# Patient Record
Sex: FEMALE | Race: BLACK OR AFRICAN AMERICAN | Employment: OTHER | URBAN - METROPOLITAN AREA
[De-identification: names, ages, dates, MRNs, and addresses within clinical notes are randomized per-mention and may not be internally consistent; named-entity substitution may affect disease eponyms.]

---

## 2019-12-20 ENCOUNTER — OFFICE VISIT (OUTPATIENT)
Dept: PRIMARY CARE CLINIC | Age: 34
End: 2019-12-20

## 2019-12-20 VITALS
OXYGEN SATURATION: 99 % | HEART RATE: 61 BPM | DIASTOLIC BLOOD PRESSURE: 80 MMHG | TEMPERATURE: 97.9 F | SYSTOLIC BLOOD PRESSURE: 122 MMHG | WEIGHT: 117 LBS | HEIGHT: 64 IN | BODY MASS INDEX: 19.97 KG/M2 | RESPIRATION RATE: 14 BRPM

## 2019-12-20 DIAGNOSIS — H53.8 BLURRED VISION, RIGHT EYE: ICD-10-CM

## 2019-12-20 DIAGNOSIS — R51.9 SUDDEN ONSET OF SEVERE HEADACHE: Primary | ICD-10-CM

## 2019-12-20 DIAGNOSIS — R41.0 EPISODIC CONFUSION: ICD-10-CM

## 2019-12-20 NOTE — PROGRESS NOTES
Written by Dayo Aburto, as dictated by Dr. Hina Bailon MD.    Ruthann Singletary is a 37 y.o. female. HPI  The patient comes in today to establish care and discuss her headache. She c/o headaches. After having her first 2 children around 4 years ago, she notes it was a very stressful time as her  was away for work, and it was a busy day. When she bent down to  something from the floor, she had an \"explosive\" R-sided headache, and was not able to talk or comprehend what her sister was saying. She was only able to say \"my head\" and \"sleep\" prior to going to sleep. She also was not able to remember to children's names either. The next morning, she was still having difficult speaking and thinking, and it took a while for it to resolve. Now, she still has difficultly with a speech delay, but it has been ok. However most recently in 09/2019, she was very upset and the following morning she had a headache with intense neck pain. The following day, she fainted, and upon waking up, her \"head was on fire,\" and had associated nausea, dizziness, blurred vision in her R eye, R ear pain with drainage, confusion, and memory issues. Denies weakness. The symptoms persisted for the next few days as well and then resolved. After a few days, she went to the [facility] ED, and was told to go to a Neurologist, as they did not find anything abnormal, including stroke or cancer. They also recommended acupuncture, yoga and other de-stressing things. They also recommending Ibuprofen 800 mg TID x 5 days, but she did not take it for long as she started having \"kidney pain. \" A CT/MRI of her head was not done at that time. She has had sinus infections in the past, growing up. Denies h/o anemia, and allergies. Her last pap smear was done in 10/2019. She last received the Tdap about 4 years ago during her last pregnancy.  She would not like to get any other vaccinations. She owns a [employer] business. She has 3 children and no longer plans to have more children. No current outpatient medications on file prior to visit. No current facility-administered medications on file prior to visit. Family History   Problem Relation Age of Onset   Sergio Gleason Hypertension Mother     Hypertension Father     Heart Attack Father     No Known Problems Sister        Social History     Socioeconomic History    Marital status: UNKNOWN     Spouse name: Not on file    Number of children: Not on file    Years of education: Not on file    Highest education level: Not on file   Occupational History    Not on file   Social Needs    Financial resource strain: Not on file    Food insecurity:     Worry: Not on file     Inability: Not on file    Transportation needs:     Medical: Not on file     Non-medical: Not on file   Tobacco Use    Smoking status: Current Every Day Smoker     Packs/day: 0.25    Smokeless tobacco: Never Used    Tobacco comment: 3/5 DAILY   Substance and Sexual Activity    Alcohol use:  Yes     Alcohol/week: 1.0 standard drinks     Types: 1 Cans of beer per week    Drug use: Never    Sexual activity: Not Currently   Lifestyle    Physical activity:     Days per week: Not on file     Minutes per session: Not on file    Stress: Not on file   Relationships    Social connections:     Talks on phone: Not on file     Gets together: Not on file     Attends Mandaeism service: Not on file     Active member of club or organization: Not on file     Attends meetings of clubs or organizations: Not on file     Relationship status: Not on file    Intimate partner violence:     Fear of current or ex partner: Not on file     Emotionally abused: Not on file     Physically abused: Not on file     Forced sexual activity: Not on file   Other Topics Concern    Not on file   Social History Narrative    Not on file       Review of Systems Constitutional: Negative for malaise/fatigue and weight loss. HENT: Positive for ear discharge (R ear) and ear pain (R ear). Negative for congestion and hearing loss. Eyes: Positive for blurred vision (R eye). Negative for photophobia. Respiratory: Negative for cough and shortness of breath. Cardiovascular: Negative for chest pain and leg swelling. Gastrointestinal: Negative for constipation, diarrhea and heartburn. Genitourinary: Negative for dysuria, frequency and urgency. Musculoskeletal: Positive for neck pain. Negative for joint pain and myalgias. Neurological: Positive for speech change (with initial headache onset) and headaches (intermittent). Negative for dizziness. Psychiatric/Behavioral: Negative for depression and substance abuse. The patient is not nervous/anxious and does not have insomnia. Visit Vitals  /80 (BP 1 Location: Left arm, BP Patient Position: Sitting)   Pulse 61   Temp 97.9 °F (36.6 °C) (Oral)   Resp 14   Ht 5' 4\" (1.626 m)   Wt 117 lb (53.1 kg)   LMP 12/03/2019   SpO2 99%   BMI 20.08 kg/m²       Physical Exam  Vitals signs and nursing note reviewed. Constitutional:       General: She is not in acute distress. Appearance: Normal appearance. She is well-developed and normal weight. She is not diaphoretic. HENT:      Head: Normocephalic and atraumatic. Right Ear: Tympanic membrane, ear canal and external ear normal.      Left Ear: Tympanic membrane, ear canal and external ear normal.      Ears:      Comments: + BL ear with fluid behind TM     Nose:      Right Sinus: No maxillary sinus tenderness. Left Sinus: No maxillary sinus tenderness. Mouth/Throat:      Mouth: Mucous membranes are moist.      Pharynx: Oropharynx is clear. Eyes:      General: Lids are normal. Gaze aligned appropriately. Right eye: No discharge. Left eye: No discharge. Extraocular Movements: Extraocular movements intact.       Conjunctiva/sclera: Conjunctivae normal.      Pupils: Pupils are equal, round, and reactive to light. Neck:      Musculoskeletal: Normal range of motion and neck supple. Cardiovascular:      Rate and Rhythm: Normal rate and regular rhythm. Heart sounds: Normal heart sounds. No murmur. No friction rub. No gallop. Pulmonary:      Effort: Pulmonary effort is normal.      Breath sounds: Normal breath sounds. No wheezing. Abdominal:      General: Bowel sounds are normal.      Palpations: Abdomen is soft. Tenderness: There is no tenderness. Musculoskeletal: Normal range of motion. Neurological:      Mental Status: She is alert and oriented to person, place, and time. Deep Tendon Reflexes: Reflexes are normal and symmetric. Psychiatric:         Behavior: Behavior normal.         Thought Content: Thought content normal.         ASSESSMENT and PLAN    ICD-10-CM ICD-9-CM    1. Sudden onset of severe headache K54 059.7 METABOLIC PANEL, COMPREHENSIVE      CBC W/O DIFF      MRI BRAIN W WO CONT  REFERRAL TO NEUROLOGY    CMP, and CBC ordered. MRI Brain ordered. Referred to Neurology. 2. Blurred vision, right eye H53.8 368.8 MRI BRAIN W WO CONT  REFERRAL TO NEUROLOGY    MRI Brain ordered. Referred to Neurology. 3. Episodic confusion R41.0 298.9 MRI BRAIN W WO CONT  REFERRAL TO NEUROLOGY    MRI Brain ordered. Referred to Neurology. This plan was reviewed with the patient and patient agrees. All questions were answered. This scribe documentation was reviewed by me and accurately reflects the examination and decisions made by me. This note will not be viewable in [address]

## 2019-12-20 NOTE — PROGRESS NOTES
Chief Complaint   Patient presents with   [address]     having headaches         1. Have you been to the ER, urgent care clinic since your last visit? Hospitalized since your last visit ? YES [facility] for headaches. 2. Have you seen or consulted any other health care providers outside of the [address] [place] [address] since your last visit? Include any pap smears or colon screening.   Yes pap normal in 10/2019

## 2019-12-21 LAB
ALBUMIN SERPL-MCNC: 4.9 G/DL (ref 3.5–5.5)
ALBUMIN/GLOB SERPL: 1.9 {RATIO} (ref 1.2–2.2)
ALP SERPL-CCNC: 49 IU/L (ref 39–117)
ALT SERPL-CCNC: 14 IU/L (ref 0–32)
AST SERPL-CCNC: 15 IU/L (ref 0–40)
BILIRUB SERPL-MCNC: 0.8 MG/DL (ref 0–1.2)
BUN SERPL-MCNC: 7 MG/DL (ref 6–20)
BUN/CREAT SERPL: 10 (ref 9–23)
CALCIUM SERPL-MCNC: 10.4 MG/DL (ref 8.7–10.2)
CHLORIDE SERPL-SCNC: 101 MMOL/L (ref 96–106)
CO2 SERPL-SCNC: 23 MMOL/L (ref 20–29)
CREAT SERPL-MCNC: 0.71 MG/DL (ref 0.57–1)
ERYTHROCYTE [DISTWIDTH] IN BLOOD BY AUTOMATED COUNT: 12 % (ref 12.3–15.4)
GLOBULIN SER CALC-MCNC: 2.6 G/DL (ref 1.5–4.5)
GLUCOSE SERPL-MCNC: 83 MG/DL (ref 65–99)
HCT VFR BLD AUTO: 40.8 % (ref 34–46.6)
HGB BLD-MCNC: 13.9 G/DL (ref 11.1–15.9)
MCH RBC QN AUTO: 31.1 PG (ref 26.6–33)
MCHC RBC AUTO-ENTMCNC: 34.1 G/DL (ref 31.5–35.7)
MCV RBC AUTO: 91 FL (ref 79–97)
PLATELET # BLD AUTO: 315 X10E3/UL (ref 150–450)
POTASSIUM SERPL-SCNC: 4.6 MMOL/L (ref 3.5–5.2)
PROT SERPL-MCNC: 7.5 G/DL (ref 6–8.5)
RBC # BLD AUTO: 4.47 X10E6/UL (ref 3.77–5.28)
SODIUM SERPL-SCNC: 140 MMOL/L (ref 134–144)
WBC # BLD AUTO: 4.7 X10E3/UL (ref 3.4–10.8)

## 2019-12-24 ENCOUNTER — HOSPITAL ENCOUNTER (OUTPATIENT)
Dept: MRI IMAGING | Age: 34
Discharge: HOME | End: 2019-12-24
Attending: INTERNAL MEDICINE
Payer: MEDICAID

## 2019-12-24 VITALS — WEIGHT: 117 LBS | BODY MASS INDEX: 20.08 KG/M2

## 2019-12-24 DIAGNOSIS — R41.0 EPISODIC CONFUSION: ICD-10-CM

## 2019-12-24 DIAGNOSIS — R51.9 SUDDEN ONSET OF SEVERE HEADACHE: ICD-10-CM

## 2019-12-24 DIAGNOSIS — H53.8 BLURRED VISION, RIGHT EYE: ICD-10-CM

## 2019-12-24 PROCEDURE — 74011250636 HC RX REV CODE- 250/636: Performed by: INTERNAL MEDICINE

## 2019-12-24 PROCEDURE — 70553 MRI BRAIN STEM W/O & W/DYE: CPT

## 2019-12-24 PROCEDURE — A9575 INJ GADOTERATE MEGLUMI 0.1ML: HCPCS | Performed by: INTERNAL MEDICINE

## 2019-12-24 RX ORDER — GADOTERATE MEGLUMINE 376.9 MG/ML
10 INJECTION INTRAVENOUS ONCE
Status: COMPLETED | OUTPATIENT
Start: 2019-12-24 | End: 2019-12-24

## 2019-12-24 RX ADMIN — GADOTERATE MEGLUMINE 10 ML: 376.9 INJECTION INTRAVENOUS at 08:05

## 2019-12-24 NOTE — PROGRESS NOTES
Seth Priest, your brain MRI came back fine. Please keep your appointment with Neurologist. He might going to order an( MRA)  which is another test to see blood vessels in your brain.

## 2020-01-07 ENCOUNTER — OFFICE VISIT (OUTPATIENT)
Dept: NEUROLOGY | Age: 35
End: 2020-01-07

## 2020-01-07 VITALS
OXYGEN SATURATION: 99 % | SYSTOLIC BLOOD PRESSURE: 106 MMHG | HEART RATE: 60 BPM | RESPIRATION RATE: 16 BRPM | HEIGHT: 64 IN | WEIGHT: 117 LBS | DIASTOLIC BLOOD PRESSURE: 68 MMHG | BODY MASS INDEX: 19.97 KG/M2

## 2020-01-07 DIAGNOSIS — G43.709 CHRONIC MIGRAINE WITHOUT AURA WITHOUT STATUS MIGRAINOSUS, NOT INTRACTABLE: Primary | ICD-10-CM

## 2020-01-07 RX ORDER — METHYLPREDNISOLONE 4 MG/1
TABLET ORAL
Qty: 1 DOSE PACK | Refills: 0 | Status: SHIPPED | OUTPATIENT
Start: 2020-01-07

## 2020-01-07 RX ORDER — NORTRIPTYLINE HYDROCHLORIDE 10 MG/1
10 CAPSULE ORAL
Qty: 30 CAP | Refills: 1 | Status: SHIPPED | OUTPATIENT
Start: 2020-01-07 | End: 2020-03-10

## 2020-01-07 RX ORDER — SUMATRIPTAN 50 MG/1
50 TABLET, FILM COATED ORAL
Qty: 9 TAB | Refills: 1 | Status: SHIPPED | OUTPATIENT
Start: 2020-01-07 | End: 2020-01-07
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
## 2020-01-07 NOTE — PROGRESS NOTES
Ms. Francine Velez presents as a new patient for evaluation of headaches and blurred vision that began approximately four months ago. Depression screening done on patient.

## 2020-01-07 NOTE — PATIENT INSTRUCTIONS
[facility]   Statement to Patients  April 1, 2014      In an effort to ensure the large volume of patient prescription refills is processed in the most efficient and expeditious manner, we are asking our patients to assist us by calling your Pharmacy for all prescription refills, this will include also your  Mail Order Pharmacy. The pharmacy will contact our office electronically to continue the refill process. Please do not wait until the last minute to call your pharmacy. We need at least 48 hours (2days) to fill prescriptions. We also encourage you to call your pharmacy before going to  your prescription to make sure it is ready. With regard to controlled substance prescription refill requests (narcotic refills) that need to be picked up at our office, we ask your cooperation by providing us with at least 72 hours (3days) notice that you will need a refill. We will not refill narcotic prescription refill requests after 4:00pm on any weekday, Monday through Thursday, or after 2:00pm on Fridays, or on the weekends. We encourage everyone to explore another way of getting your prescription refill request processed using [employer], our patient web portal through our electronic medical record system. [employer] is an efficient and effective way to communicate your medication request directly to the office and  downloadable as an nina on your smart phone . [employer] also features a review functionality that allows you to view your medication list as well as leave messages for your physician. Are you ready to get connected? If so please review the attatched instructions or speak to any of our staff to get you set up right away! Thank you so much for your cooperation. Should you have any questions please contact our Practice Administrator.     The Physicians and Staff,  [facility]

## 2020-01-14 ENCOUNTER — OFFICE VISIT (OUTPATIENT)
Dept: PRIMARY CARE CLINIC | Age: 35
End: 2020-01-14

## 2020-01-14 VITALS
DIASTOLIC BLOOD PRESSURE: 61 MMHG | TEMPERATURE: 98.8 F | BODY MASS INDEX: 20.08 KG/M2 | HEIGHT: 64 IN | HEART RATE: 68 BPM | RESPIRATION RATE: 14 BRPM | OXYGEN SATURATION: 99 % | SYSTOLIC BLOOD PRESSURE: 126 MMHG | WEIGHT: 117.6 LBS

## 2020-01-14 DIAGNOSIS — R42 DIZZINESS: ICD-10-CM

## 2020-01-14 DIAGNOSIS — H93.8X1 SENSATION OF FULLNESS IN EAR, RIGHT: ICD-10-CM

## 2020-01-14 DIAGNOSIS — R51.9 SEVERE HEADACHE: Primary | ICD-10-CM

## 2020-01-14 DIAGNOSIS — H53.8 BLURRED VISION, BILATERAL: ICD-10-CM
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
## 2020-01-14 NOTE — PROGRESS NOTES
Visit Vitals  /61 (BP 1 Location: Left arm, BP Patient Position: Sitting)   Pulse 68   Temp 98.8 °F (37.1 °C) (Oral)   Resp 14   Ht 5' 4\" (1.626 m)   Wt 117 lb 9.6 oz (53.3 kg)   SpO2 99%   BMI 20.19 kg/m²             Chief Complaint   Patient presents with    Headache     Consistent, persistent 3/10    Referral Follow Up     Neurology; Headaches (unresolved)           HM Due reviewed and WNL. 1. Have you been to the ER, urgent care clinic since your last visit? Hospitalized since your last visit? Denies     2. Have you seen or consulted any other health care providers outside of the [address] [place] [address] since your last visit? Include any pap smears or colon screening.  Denies

## 2020-07-21 ENCOUNTER — OFFICE VISIT (OUTPATIENT)
Dept: NEUROLOGY | Age: 35
End: 2020-07-21

## 2020-07-21 VITALS — HEIGHT: 64 IN | WEIGHT: 117 LBS | BODY MASS INDEX: 19.97 KG/M2

## 2020-07-21 DIAGNOSIS — M54.81 OCCIPITAL NEURALGIA OF RIGHT SIDE: ICD-10-CM

## 2020-07-21 DIAGNOSIS — G43.709 CHRONIC MIGRAINE WITHOUT AURA WITHOUT STATUS MIGRAINOSUS, NOT INTRACTABLE: Primary | ICD-10-CM

## 2020-07-21 RX ORDER — AMITRIPTYLINE HYDROCHLORIDE 25 MG/1
25 TABLET, FILM COATED ORAL
Qty: 30 TAB | Refills: 1 | Status: SHIPPED | OUTPATIENT
Start: 2020-07-21

## 2020-07-21 NOTE — PROGRESS NOTES
Ms. Kameron Cardona presents today to follow up chronic pain of neck and right side of head. She also reports occasional drainage from right ear. Patient has not seen ENT. Patient's symptoms began last September. Depression screening done on patient.
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
## 2020-07-21 NOTE — PATIENT INSTRUCTIONS
[facility]   Statement to Patients  April 1, 2014      In an effort to ensure the large volume of patient prescription refills is processed in the most efficient and expeditious manner, we are asking our patients to assist us by calling your Pharmacy for all prescription refills, this will include also your  Mail Order Pharmacy. The pharmacy will contact our office electronically to continue the refill process. Please do not wait until the last minute to call your pharmacy. We need at least 48 hours (2days) to fill prescriptions. We also encourage you to call your pharmacy before going to  your prescription to make sure it is ready. With regard to controlled substance prescription refill requests (narcotic refills) that need to be picked up at our office, we ask your cooperation by providing us with at least 72 hours (3days) notice that you will need a refill. We will not refill narcotic prescription refill requests after 4:00pm on any weekday, Monday through Thursday, or after 2:00pm on Fridays, or on the weekends. We encourage everyone to explore another way of getting your prescription refill request processed using [email or web address], our patient web portal through our electronic medical record system. [email or web address] is an efficient and effective way to communicate your medication request directly to the office and  downloadable as an nina on your smart phone . [email or web address] also features a review functionality that allows you to view your medication list as well as leave messages for your physician. Are you ready to get connected? If so please review the attatched instructions or speak to any of our staff to get you set up right away! Thank you so much for your cooperation. Should you have any questions please contact our Practice Administrator.     The Physicians and Staff,  [facility]

## 2023-05-18 RX ORDER — METHYLPREDNISOLONE 4 MG/1
TABLET ORAL
COMMUNITY
Start: 2020-01-07

## 2023-05-18 RX ORDER — AMITRIPTYLINE HYDROCHLORIDE 25 MG/1
25 TABLET, FILM COATED ORAL
COMMUNITY
Start: 2020-07-21